# Patient Record
Sex: MALE | Race: WHITE | NOT HISPANIC OR LATINO | Employment: FULL TIME | URBAN - METROPOLITAN AREA
[De-identification: names, ages, dates, MRNs, and addresses within clinical notes are randomized per-mention and may not be internally consistent; named-entity substitution may affect disease eponyms.]

---

## 2017-12-28 ENCOUNTER — OFFICE VISIT (OUTPATIENT)
Dept: FAMILY MEDICINE CLINIC | Facility: CLINIC | Age: 57
End: 2017-12-28

## 2017-12-28 ENCOUNTER — TELEPHONE (OUTPATIENT)
Dept: FAMILY MEDICINE CLINIC | Facility: CLINIC | Age: 57
End: 2017-12-28

## 2017-12-28 VITALS
BODY MASS INDEX: 34.53 KG/M2 | OXYGEN SATURATION: 96 % | HEIGHT: 67 IN | WEIGHT: 220 LBS | RESPIRATION RATE: 18 BRPM | TEMPERATURE: 98.5 F | HEART RATE: 80 BPM | DIASTOLIC BLOOD PRESSURE: 78 MMHG | SYSTOLIC BLOOD PRESSURE: 120 MMHG

## 2017-12-28 DIAGNOSIS — Z76.89 ENCOUNTER TO ESTABLISH CARE WITH NEW DOCTOR: ICD-10-CM

## 2017-12-28 DIAGNOSIS — R05.9 COUGH: Primary | ICD-10-CM

## 2017-12-28 DIAGNOSIS — R93.89 ABNORMAL CXR: ICD-10-CM

## 2017-12-28 PROCEDURE — 99203 OFFICE O/P NEW LOW 30 MIN: CPT | Performed by: NURSE PRACTITIONER

## 2017-12-28 RX ORDER — TADALAFIL 20 MG
TABLET ORAL
COMMUNITY
Start: 2017-11-10

## 2017-12-28 RX ORDER — ALBUTEROL SULFATE 90 UG/1
2 AEROSOL, METERED RESPIRATORY (INHALATION) EVERY 4 HOURS PRN
Qty: 1 INHALER | Refills: 1 | Status: SHIPPED | OUTPATIENT
Start: 2017-12-28 | End: 2020-10-30

## 2017-12-28 RX ORDER — AZITHROMYCIN 250 MG/1
TABLET, FILM COATED ORAL
Qty: 6 TABLET | Refills: 0 | Status: SHIPPED | OUTPATIENT
Start: 2017-12-28 | End: 2020-10-30

## 2017-12-28 RX ORDER — BENZONATATE 100 MG/1
100 CAPSULE ORAL 3 TIMES DAILY PRN
Qty: 21 CAPSULE | Refills: 0 | Status: SHIPPED | OUTPATIENT
Start: 2017-12-28 | End: 2020-10-30

## 2017-12-28 RX ORDER — CLOBETASOL PROPIONATE 0.5 MG/G
CREAM TOPICAL
COMMUNITY
Start: 2017-12-20 | End: 2020-10-30

## 2017-12-28 NOTE — PROGRESS NOTES
Subjective   Saurabh Rogel is a 57 y.o. male.     History of Present Illness   Here to establish care, he saw a doctor at [place] years ago, but not recent. He had a work physical, works at [employer], did cxr, was told it was abnormal, thought he had pneumonia, he waas not given any medication, he had blood work for physical, did not bring copy of labs, he does have a cough in am, he states he had a cold about 3 weeks ago, he states he did not pass his pulmonary test at the physical, he has never smoked, he denies any asthma history, denies seasonal allergies. He has a productive cough, clear and green in color, he denies fever, he has noticed some wheezing with deep breath, denies fatigue, denies diarrhea.  He states the cough is better but not resolved   He states he is otherwise doing well, he does not exercise, his wife is in the room with him today. He states his diet could use improving. He sees derm for eczema, sees Dr. Patt English, he takes cialis as needed, sees Dr. Leone at [facility] urology. He denies any BP issues, denies CP, SOA. He had colonoscopy at age 48.   He has a hx of  Prostate biopsy, he has had hx of elevated PSA, he states biopsy was normal, he sees urology yearly.     The following portions of the patient's history were reviewed and updated as appropriate: allergies, current medications, past family history, past medical history, past social history, past surgical history and problem list.    Review of Systems   Constitutional: Negative for chills, diaphoresis, fatigue and fever.   HENT: Positive for congestion. Negative for ear pain, postnasal drip, rhinorrhea, sinus pressure and sore throat.    Respiratory: Positive for cough and wheezing. Negative for chest tightness and shortness of breath.    Cardiovascular: Negative for chest pain, palpitations and leg swelling.   Gastrointestinal: Negative for diarrhea.   Musculoskeletal: Negative for arthralgias and myalgias.   Skin:  Negative for pallor.   Allergic/Immunologic: Negative for environmental allergies.   Neurological: Negative for light-headedness and headaches.   All other systems reviewed and are negative.      Objective   Physical Exam   Constitutional: He is oriented to person, place, and time. He appears well-developed and well-nourished.   HENT:   Head: Normocephalic.   Right Ear: Tympanic membrane and ear canal normal.   Left Ear: Tympanic membrane and ear canal normal.   Nose: Nose normal.   Mouth/Throat: Uvula is midline, oropharynx is clear and moist and mucous membranes are normal.   Eyes: Pupils are equal, round, and reactive to light.   Neck: Neck supple.   Cardiovascular: Normal rate, regular rhythm and normal heart sounds.    Pulmonary/Chest: Effort normal. He has no decreased breath sounds. He has wheezes in the right lower field. He has rhonchi in the right lower field and the left lower field. He has no rales.   Musculoskeletal: Normal range of motion.   Neurological: He is alert and oriented to person, place, and time.   Skin: Skin is warm and dry.   Psychiatric: He has a normal mood and affect. His behavior is normal. Judgment and thought content normal.   Nursing note and vitals reviewed.      Assessment/Plan   Saurabh was seen today for cough.    Diagnoses and all orders for this visit:    Cough    Abnormal CXR    Encounter to establish care with new doctor    Other orders  -     azithromycin (ZITHROMAX) 250 MG tablet; Take 2 tablets the first day, then 1 tablet daily for 4 days.  -     benzonatate (TESSALON PERLES) 100 MG capsule; Take 1 capsule by mouth 3 (Three) Times a Day As Needed for Cough.  -     albuterol (PROVENTIL HFA;VENTOLIN HFA) 108 (90 Base) MCG/ACT inhaler; Inhale 2 puffs Every 4 (Four) Hours As Needed for Wheezing.    Start zpack, take as directed   Tessalon perles 100mg up to 3x day as needed for cough.  Albuterol inhaler 2 puffs every 4 hours as needed for SOA or wheezing. Educated about  use.  Unable to load disc with cxr, patient will fax radiology report and labs to office for review. Unavailable at time of appt.  F/u in about 3-4 weeks.  Increase fluid intake, get plenty of rest.   Patient agrees with plan of care and understands instructions. Call if worsening symptoms or any problems or concerns.

## 2017-12-28 NOTE — TELEPHONE ENCOUNTER
[facility] work physical documents reviewed, cxr radiology report from 12/19/17 shows normal cxr. No pneumonia noted. PFTs abnormal on report. Will refer to pulmonology and he will be called with appt. CHOL also mildly elevated, encourage low CHOL diet, exercise 20-30 min 3 days per week low impact such as walking, recheck in about 2 months.

## 2017-12-28 NOTE — PATIENT INSTRUCTIONS
Start zpack, take as directed   Tessalon perles 100mg up to 3x day as needed for cough.  Albuterol inhaler 2 puffs every 4 hours as needed for SOA or wheezing. Educated about use.  Unable to load disc with cxr, patient will fax radiology report and labs to office for review. Unavailable at time of appt.  F/u in about 3-4 weeks.  Increase fluid intake, get plenty of rest.   Patient agrees with plan of care and understands instructions. Call if worsening symptoms or any problems or concerns.

## 2020-10-30 ENCOUNTER — OFFICE VISIT (OUTPATIENT)
Dept: FAMILY MEDICINE CLINIC | Facility: CLINIC | Age: 60
End: 2020-10-30

## 2020-10-30 VITALS
HEART RATE: 72 BPM | SYSTOLIC BLOOD PRESSURE: 122 MMHG | RESPIRATION RATE: 18 BRPM | BODY MASS INDEX: 35.94 KG/M2 | WEIGHT: 229 LBS | TEMPERATURE: 98 F | HEIGHT: 67 IN | OXYGEN SATURATION: 99 % | DIASTOLIC BLOOD PRESSURE: 78 MMHG

## 2020-10-30 DIAGNOSIS — Z12.11 ENCOUNTER FOR SCREENING COLONOSCOPY: ICD-10-CM

## 2020-10-30 DIAGNOSIS — Z23 NEED FOR VACCINATION: ICD-10-CM

## 2020-10-30 DIAGNOSIS — H93.13 TINNITUS OF BOTH EARS: ICD-10-CM

## 2020-10-30 DIAGNOSIS — Z00.00 ANNUAL PHYSICAL EXAM: Primary | ICD-10-CM

## 2020-10-30 LAB
ALBUMIN SERPL-MCNC: 4.4 G/DL (ref 3.5–5.2)
ALBUMIN/GLOB SERPL: 1.8 G/DL
ALP SERPL-CCNC: 59 U/L (ref 39–117)
ALT SERPL W P-5'-P-CCNC: 22 U/L (ref 1–41)
ANION GAP SERPL CALCULATED.3IONS-SCNC: 9.3 MMOL/L (ref 5–15)
AST SERPL-CCNC: 20 U/L (ref 1–40)
BILIRUB SERPL-MCNC: 0.7 MG/DL (ref 0–1.2)
BUN SERPL-MCNC: 17 MG/DL (ref 8–23)
BUN/CREAT SERPL: 19.5 (ref 7–25)
CALCIUM SPEC-SCNC: 9.3 MG/DL (ref 8.6–10.5)
CHLORIDE SERPL-SCNC: 104 MMOL/L (ref 98–107)
CHOLEST SERPL-MCNC: 211 MG/DL (ref 0–200)
CO2 SERPL-SCNC: 24.7 MMOL/L (ref 22–29)
CREAT SERPL-MCNC: 0.87 MG/DL (ref 0.76–1.27)
GFR SERPL CREATININE-BSD FRML MDRD: 89 ML/MIN/1.73
GLOBULIN UR ELPH-MCNC: 2.4 GM/DL
GLUCOSE SERPL-MCNC: 96 MG/DL (ref 65–99)
HDLC SERPL-MCNC: 84 MG/DL (ref 40–60)
LDLC SERPL CALC-MCNC: 116 MG/DL (ref 0–100)
LDLC/HDLC SERPL: 1.36 {RATIO}
POTASSIUM SERPL-SCNC: 4.5 MMOL/L (ref 3.5–5.2)
PROT SERPL-MCNC: 6.8 G/DL (ref 6–8.5)
SODIUM SERPL-SCNC: 138 MMOL/L (ref 136–145)
TRIGL SERPL-MCNC: 63 MG/DL (ref 0–150)
VLDLC SERPL-MCNC: 11 MG/DL (ref 5–40)

## 2020-10-30 PROCEDURE — 80061 LIPID PANEL: CPT | Performed by: NURSE PRACTITIONER

## 2020-10-30 PROCEDURE — 90686 IIV4 VACC NO PRSV 0.5 ML IM: CPT | Performed by: NURSE PRACTITIONER

## 2020-10-30 PROCEDURE — 80053 COMPREHEN METABOLIC PANEL: CPT | Performed by: NURSE PRACTITIONER

## 2020-10-30 PROCEDURE — 90471 IMMUNIZATION ADMIN: CPT | Performed by: NURSE PRACTITIONER

## 2020-10-30 PROCEDURE — 99396 PREV VISIT EST AGE 40-64: CPT | Performed by: NURSE PRACTITIONER

## 2020-10-30 PROCEDURE — 36415 COLL VENOUS BLD VENIPUNCTURE: CPT | Performed by: NURSE PRACTITIONER

## 2020-10-30 NOTE — PATIENT INSTRUCTIONS
Labs today will call with results.   Refer for screening colonoscopy will call with appt,   Flu vaccine today,   Encouraged low chol diet, regular exercise such as walking.   May try claritin OTC as needed for fluid on ears,ringing, if symptoms persist call office will refer to ENT if needed. Deferred today.   Deferred ekg or cxr today.   Cont f/u with urology as scheduled.   Patient agrees with plan of care and understands instructions. Call if worsening symptoms or any problems or concerns.

## 2020-10-30 NOTE — PROGRESS NOTES
Subjective   Saurabh Rogel is a 60 y.o. male.     History of Present Illness   Here today for annual exam, he is retired, he does stay active, work around his daughter's house, he states working on diet, denies smoking. UTD dentist, he is also UTD eye exam, he would like flu vaccine today. He is fasting today.   He sees Dr. Ray, first urology, UTD prostate exam, he states he is UTD on tdap about 4 years ago. States again later this year.   He c/o tinnitus, states he noticed about 2 years ago. He states prev job had constant loud noise, has always had decreased hearing in left ear, did have hearing test yearly at work. Denies allergies, ear itching, denies muffled hearing.  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 Denies cough, SOA, difficulty breathing, does not smoke or use inhaler, he was referred to pulm in 2017 but [facility]     The following portions of the patient's history were reviewed and updated as appropriate: allergies, current medications, past family history, past medical history, past social history, past surgical history and problem list.    Review of Systems   Constitutional: Negative for chills, diaphoresis and fever.   HENT: Positive for tinnitus.    Respiratory: Negative for cough and shortness of breath.    Cardiovascular: Negative for chest pain.   Gastrointestinal: Negative for abdominal pain.   Genitourinary: Negative for frequency and urgency.   Musculoskeletal: Negative for arthralgias and myalgias.   Neurological: Negative for dizziness, light-headedness and headache.   All other systems reviewed and are negative.      Objective   Physical Exam  Vitals signs and nursing note reviewed.   Constitutional:       Appearance: He is well-developed.   HENT:      Head: Normocephalic.      Right Ear: Tympanic membrane and ear canal normal.      Left Ear: Tympanic membrane and ear canal normal.   Eyes:      Pupils: Pupils are  equal, round, and reactive to light.   Cardiovascular:      Rate and Rhythm: Normal rate and regular rhythm.      Pulses:           Radial pulses are 2+ on the right side and 2+ on the left side.        Dorsalis pedis pulses are 2+ on the right side and 2+ on the left side.        Posterior tibial pulses are 2+ on the right side and 2+ on the left side.      Heart sounds: Normal heart sounds.   Pulmonary:      Effort: Pulmonary effort is normal.      Breath sounds: Normal breath sounds. No decreased breath sounds or wheezing.   Abdominal:      General: Abdomen is flat.      Palpations: Abdomen is soft.      Tenderness: There is no abdominal tenderness.   Skin:     General: Skin is warm and dry.   Neurological:      Mental Status: He is alert and oriented to person, place, and time.      Cranial Nerves: Cranial nerves are intact.      Sensory: Sensation is intact.      Motor: Motor function is intact.      Coordination: Coordination is intact.   Psychiatric:         Behavior: Behavior normal.         Judgment: Judgment normal.           Assessment/Plan   Diagnoses and all orders for this visit:    1. Annual physical exam (Primary)  -     Comprehensive Metabolic Panel  -     Lipid Panel  -     Fluarix Quad >6 Months (2223-0628)    2. Encounter for screening colonoscopy  -     Ambulatory Referral For Screening Colonoscopy    3. Tinnitus of both ears  -     Comprehensive Metabolic Panel  -     Lipid Panel    4. Need for vaccination  -     Fluarix Quad >6 Months (9317-4055)        Labs today will call with results.   Refer for screening colonoscopy will call with appt,   Flu vaccine today,   Encouraged low chol diet, regular exercise such as walking.   May try claritin OTC as needed for fluid on ears,ringing, if symptoms persist call office will refer to ENT if needed. Deferred today.   Deferred ekg or cxr today.   If cough, SOA wheezing call office can consider repeat pft or pulm consult, can also consider inhaler if  needed.   Cont f/u with urology as scheduled.   Patient agrees with plan of care and understands instructions. Call if worsening symptoms or any problems or concerns.

## 2021-11-08 ENCOUNTER — TELEPHONE (OUTPATIENT)
Dept: FAMILY MEDICINE CLINIC | Facility: CLINIC | Age: 61
End: 2021-11-08

## 2021-11-08 NOTE — TELEPHONE ENCOUNTER
Caller: Kaya Rogel    Relationship to patient: Emergency Contact    Best call back number: [phone]    Patient is needing: PATIENT IS SCHEDULED TO GET SHINGLES VACCINE, FLU VACCINE, AND COVID-19 BOOSTER VACCINE TOMORROW 11/09/2021. PATIENT'S WIFE WANTING TO KNOW IF IT IS SAFE OR ADVISED TO GET ALL 3 VACCINES AT ONCE. CALLBACK REQUESTED

## 2022-08-01 ENCOUNTER — PATIENT ROUNDING (BHMG ONLY) (OUTPATIENT)
Dept: FAMILY MEDICINE CLINIC | Facility: CLINIC | Age: 62
End: 2022-08-01

## 2022-08-01 ENCOUNTER — OFFICE VISIT (OUTPATIENT)
Dept: FAMILY MEDICINE CLINIC | Facility: CLINIC | Age: 62
End: 2022-08-01

## 2022-08-01 VITALS
WEIGHT: 238 LBS | HEART RATE: 71 BPM | HEIGHT: 67 IN | DIASTOLIC BLOOD PRESSURE: 78 MMHG | OXYGEN SATURATION: 98 % | SYSTOLIC BLOOD PRESSURE: 126 MMHG | TEMPERATURE: 97.3 F | RESPIRATION RATE: 18 BRPM | BODY MASS INDEX: 37.35 KG/M2

## 2022-08-01 DIAGNOSIS — M17.12 ARTHRITIS OF LEFT KNEE: ICD-10-CM

## 2022-08-01 DIAGNOSIS — Z11.59 ENCOUNTER FOR HEPATITIS C SCREENING TEST FOR LOW RISK PATIENT: ICD-10-CM

## 2022-08-01 DIAGNOSIS — Z12.11 SCREEN FOR COLON CANCER: ICD-10-CM

## 2022-08-01 DIAGNOSIS — Z00.00 ANNUAL PHYSICAL EXAM: Primary | ICD-10-CM

## 2022-08-01 LAB
ALBUMIN SERPL-MCNC: 4.7 G/DL (ref 3.5–5.2)
ALBUMIN/GLOB SERPL: 2 G/DL
ALP SERPL-CCNC: 60 U/L (ref 39–117)
ALT SERPL W P-5'-P-CCNC: 24 U/L (ref 1–41)
ANION GAP SERPL CALCULATED.3IONS-SCNC: 11.2 MMOL/L (ref 5–15)
AST SERPL-CCNC: 21 U/L (ref 1–40)
BILIRUB SERPL-MCNC: 0.7 MG/DL (ref 0–1.2)
BUN SERPL-MCNC: 14 MG/DL (ref 8–23)
BUN/CREAT SERPL: 15.2 (ref 7–25)
CALCIUM SPEC-SCNC: 9.5 MG/DL (ref 8.6–10.5)
CHLORIDE SERPL-SCNC: 105 MMOL/L (ref 98–107)
CHOLEST SERPL-MCNC: 219 MG/DL (ref 0–200)
CO2 SERPL-SCNC: 25.8 MMOL/L (ref 22–29)
CREAT SERPL-MCNC: 0.92 MG/DL (ref 0.76–1.27)
EGFRCR SERPLBLD CKD-EPI 2021: 92.9 ML/MIN/1.73
ERYTHROCYTE [DISTWIDTH] IN BLOOD BY AUTOMATED COUNT: 12.8 % (ref 12.3–15.4)
GLOBULIN UR ELPH-MCNC: 2.4 GM/DL
GLUCOSE SERPL-MCNC: 98 MG/DL (ref 65–99)
HCT VFR BLD AUTO: 45.6 % (ref 37.5–51)
HCV AB SER DONR QL: NORMAL
HDLC SERPL-MCNC: 75 MG/DL (ref 40–60)
HGB BLD-MCNC: 14.8 G/DL (ref 13–17.7)
LDLC SERPL CALC-MCNC: 117 MG/DL (ref 0–100)
LDLC/HDLC SERPL: 1.51 {RATIO}
LYMPHOCYTES # BLD AUTO: 2.1 10*3/MM3 (ref 0.7–3.1)
LYMPHOCYTES NFR BLD AUTO: 30.4 % (ref 19.6–45.3)
MCH RBC QN AUTO: 29.5 PG (ref 26.6–33)
MCHC RBC AUTO-ENTMCNC: 32.4 G/DL (ref 31.5–35.7)
MCV RBC AUTO: 91 FL (ref 79–97)
MONOCYTES # BLD AUTO: 0.2 10*3/MM3 (ref 0.1–0.9)
MONOCYTES NFR BLD AUTO: 2.8 % (ref 5–12)
NEUTROPHILS NFR BLD AUTO: 4.5 10*3/MM3 (ref 1.7–7)
NEUTROPHILS NFR BLD AUTO: 66.8 % (ref 42.7–76)
PLATELET # BLD AUTO: 273 10*3/MM3 (ref 140–450)
PMV BLD AUTO: 8.3 FL (ref 6–12)
POTASSIUM SERPL-SCNC: 4.7 MMOL/L (ref 3.5–5.2)
PROT SERPL-MCNC: 7.1 G/DL (ref 6–8.5)
RBC # BLD AUTO: 5.01 10*6/MM3 (ref 4.14–5.8)
SODIUM SERPL-SCNC: 142 MMOL/L (ref 136–145)
TRIGL SERPL-MCNC: 154 MG/DL (ref 0–150)
TSH SERPL DL<=0.05 MIU/L-ACNC: 1.52 UIU/ML (ref 0.27–4.2)
VLDLC SERPL-MCNC: 27 MG/DL (ref 5–40)
WBC NRBC COR # BLD: 6.8 10*3/MM3 (ref 3.4–10.8)

## 2022-08-01 PROCEDURE — 84443 ASSAY THYROID STIM HORMONE: CPT | Performed by: NURSE PRACTITIONER

## 2022-08-01 PROCEDURE — 80053 COMPREHEN METABOLIC PANEL: CPT | Performed by: NURSE PRACTITIONER

## 2022-08-01 PROCEDURE — 86803 HEPATITIS C AB TEST: CPT | Performed by: NURSE PRACTITIONER

## 2022-08-01 PROCEDURE — 85025 COMPLETE CBC W/AUTO DIFF WBC: CPT | Performed by: NURSE PRACTITIONER

## 2022-08-01 PROCEDURE — 80061 LIPID PANEL: CPT | Performed by: NURSE PRACTITIONER

## 2022-08-01 PROCEDURE — 99396 PREV VISIT EST AGE 40-64: CPT | Performed by: NURSE PRACTITIONER

## 2022-08-01 RX ORDER — MELOXICAM 15 MG/1
TABLET ORAL
COMMUNITY
Start: 2022-07-20

## 2022-08-01 NOTE — PROGRESS NOTES
August 1, 2022    Hello, may I speak with Saurabh Rogel?    My name is Dennise      I am  with [facility]  [address] [place] [id number] [phone].    Before we get started may I verify your date of birth? 1960    I am calling to officially welcome you to our practice and ask about your recent visit. Is this a good time to talk? No    Tell me about your visit with us. What things went well? All went well       We're always looking for ways to make our patients' experiences even better. Do you have recommendations on ways we may improve?  No    Overall were you satisfied with your first visit to our practice? Yes       I appreciate you taking the time to speak with me today. Is there anything else I can do for you? No      Thank you, and have a great day.

## 2022-08-01 NOTE — PROGRESS NOTES
"Chief Complaint  Annual Exam (Wellness check up hasnt had labs/bp in a couple years)    Subjective        Saurabh Rogel presents to [facility]  Patient presents to the office for an annual visit. Patient blood pressure is BP: 126/78 (08/01/22 0822).  Patient has arthritis in his left knee.  Patient takes meloxicam as needed.  Patient is stable for with joint pain.  Patient is due for colonoscopy.  Patient follows urology to have PSA level checked.  Patient denies depression and/or anxiety.  Patient denies chest pain / shortness of air. Patient is without acute pain or distress at time of dictation.         Objective   Vital Signs:  /78 (BP Location: Left arm, Patient Position: Sitting)   Pulse 71   Temp 97.3 °F (36.3 °C) (Infrared)   Resp 18   Ht 170.2 cm (67\")   Wt 108 kg (238 lb)   SpO2 98%   BMI 37.28 kg/m²   Estimated body mass index is 37.28 kg/m² as calculated from the following:    Height as of this encounter: 170.2 cm (67\").    Weight as of this encounter: 108 kg (238 lb).    Class 2 Severe Obesity (BMI >=35 and <=39.9). Obesity-related health conditions include the following: none. Obesity is unchanged. BMI is is above average; BMI management plan is completed. We discussed portion control and increasing exercise.      Physical Exam  Constitutional:       General: He is not in acute distress.     Appearance: Normal appearance. He is not ill-appearing, toxic-appearing or diaphoretic.   HENT:      Head: Normocephalic.      Right Ear: Tympanic membrane and external ear normal. There is no impacted cerumen.      Left Ear: Tympanic membrane and external ear normal. There is no impacted cerumen.      Nose: Nose normal. No congestion or rhinorrhea.      Mouth/Throat:      Mouth: Mucous membranes are moist.      Pharynx: Oropharynx is clear. No oropharyngeal exudate or posterior oropharyngeal erythema.   Eyes:      General:         Right eye: No discharge.         Left " eye: No discharge.      Extraocular Movements: Extraocular movements intact.      Conjunctiva/sclera: Conjunctivae normal.      Pupils: Pupils are equal, round, and reactive to light.   Neck:      Vascular: No carotid bruit.   Cardiovascular:      Rate and Rhythm: Normal rate and regular rhythm.      Pulses: Normal pulses.      Heart sounds: Normal heart sounds. No murmur heard.    No friction rub. No gallop.   Pulmonary:      Effort: Pulmonary effort is normal. No respiratory distress.      Breath sounds: Normal breath sounds. No wheezing, rhonchi or rales.   Chest:      Chest wall: No tenderness.   Abdominal:      General: Abdomen is flat. Bowel sounds are normal. There is no distension.      Palpations: Abdomen is soft. There is no mass.      Tenderness: There is no abdominal tenderness. There is no guarding or rebound.      Hernia: No hernia is present.   Musculoskeletal:         General: No swelling. Normal range of motion.      Cervical back: Normal range of motion and neck supple. No tenderness.      Right knee: Normal.      Left knee: Tenderness present.   Skin:     General: Skin is warm and dry.      Coloration: Skin is not jaundiced or pale.      Findings: No erythema or rash.   Neurological:      Mental Status: He is alert and oriented to person, place, and time.      Sensory: No sensory deficit.      Motor: No weakness.      Gait: Gait normal.   Psychiatric:         Mood and Affect: Mood normal.         Behavior: Behavior normal.         Thought Content: Thought content normal.         Judgment: Judgment normal.        Result Review :  The following data was reviewed by: AMRIT Chavarria on 08/01/2022:  Common labs    Common Labsle 8/1/22   WBC 6.80   Hemoglobin 14.8   Hematocrit 45.6   Platelets 273                    Assessment and Plan   Diagnoses and all orders for this visit:    1. Annual physical exam (Primary)  -     CBC & Differential  -     Comprehensive Metabolic Panel  -     Lipid  Panel  -     TSH    2. Arthritis of left knee    3. Encounter for hepatitis C screening test for low risk patient  -     Hepatitis C antibody    4. Screen for colon cancer  -     Ambulatory Referral For Screening Colonoscopy      Counseling was provided on nutrition, physical activity, development, and injury prevention, dental health, and safe sex practices patient verbalizes understanding no additional questions were asked.    Arthritis stable with meloxicam.  Patient takes as needed.  Check labs today for medication monitoring.  CMP.       I spent 30 minutes caring for Saurabh on this date of service. This time includes time spent by me in the following activities:preparing for the visit, reviewing tests, obtaining and/or reviewing a separately obtained history, performing a medically appropriate examination and/or evaluation , counseling and educating the patient/family/caregiver, ordering medications, tests, or procedures and documenting information in the medical record  Follow Up   Return in about 1 year (around 8/1/2023) for Annual physical.  Patient was given instructions and counseling regarding his condition or for health maintenance advice. Please see specific information pulled into the AVS if appropriate.

## 2022-10-19 ENCOUNTER — TELEPHONE (OUTPATIENT)
Dept: FAMILY MEDICINE CLINIC | Facility: CLINIC | Age: 62
End: 2022-10-19

## 2022-10-19 NOTE — TELEPHONE ENCOUNTER
Caller: Kaya Rogel    Relationship: Emergency Contact    Best call back number: [phone]    What specialty or service is being requested: COLONOSCOPY    What is the provider, practice or medical service name: DR PARKER    Any additional details: PATIENTS WIFE STATED THEY WERE DIRECTED TO [facility] WITH THE FAX NUMBER FOR DR SANTO OFFICE.    PATIENTS WIFE STATED THAT THE FAX NUMBER [phone]

## 2023-10-06 ENCOUNTER — TELEPHONE (OUTPATIENT)
Dept: FAMILY MEDICINE CLINIC | Facility: CLINIC | Age: 63
End: 2023-10-06

## 2023-10-06 NOTE — TELEPHONE ENCOUNTER
Caller: Kaya Rogel    Relationship: Emergency Contact    Best call back number: [phone]     What was the call regarding: PATIENT IS SCHEDULED FOR 10/9/23 FOR HIS PHYSICAL WITH [facility] DUE TO [employer] NOT HAVING ANY OPENINGS UNTIL DECEMBER     PLEASE ADVISE

## 2023-10-09 ENCOUNTER — OFFICE VISIT (OUTPATIENT)
Dept: FAMILY MEDICINE CLINIC | Facility: CLINIC | Age: 63
End: 2023-10-09
Payer: MEDICARE

## 2023-10-09 VITALS
HEART RATE: 76 BPM | TEMPERATURE: 97 F | SYSTOLIC BLOOD PRESSURE: 144 MMHG | HEIGHT: 67 IN | DIASTOLIC BLOOD PRESSURE: 98 MMHG | OXYGEN SATURATION: 97 % | BODY MASS INDEX: 37.35 KG/M2 | WEIGHT: 238 LBS

## 2023-10-09 DIAGNOSIS — E78.2 MIXED HYPERLIPIDEMIA: ICD-10-CM

## 2023-10-09 DIAGNOSIS — Z23 ENCOUNTER FOR IMMUNIZATION: ICD-10-CM

## 2023-10-09 DIAGNOSIS — Z12.5 SCREENING FOR PROSTATE CANCER: ICD-10-CM

## 2023-10-09 DIAGNOSIS — G89.29 CHRONIC RIGHT SHOULDER PAIN: ICD-10-CM

## 2023-10-09 DIAGNOSIS — M17.12 ARTHRITIS OF LEFT KNEE: ICD-10-CM

## 2023-10-09 DIAGNOSIS — Z00.00 ENCOUNTER FOR ANNUAL PHYSICAL EXAM: Primary | ICD-10-CM

## 2023-10-09 DIAGNOSIS — M25.511 CHRONIC RIGHT SHOULDER PAIN: ICD-10-CM

## 2023-10-09 DIAGNOSIS — E66.9 CLASS 2 OBESITY WITHOUT SERIOUS COMORBIDITY WITH BODY MASS INDEX (BMI) OF 37.0 TO 37.9 IN ADULT, UNSPECIFIED OBESITY TYPE: ICD-10-CM

## 2023-10-09 DIAGNOSIS — Z13.29 SCREENING FOR THYROID DISORDER: ICD-10-CM

## 2023-10-09 LAB
ALBUMIN SERPL-MCNC: 4.8 G/DL (ref 3.5–5.2)
ALBUMIN/GLOB SERPL: 2.3 G/DL
ALP SERPL-CCNC: 65 U/L (ref 39–117)
ALT SERPL-CCNC: 24 U/L (ref 1–41)
AST SERPL-CCNC: 20 U/L (ref 1–40)
BASOPHILS # BLD AUTO: 0.03 10*3/MM3 (ref 0–0.2)
BASOPHILS NFR BLD AUTO: 0.4 % (ref 0–1.5)
BILIRUB SERPL-MCNC: 0.8 MG/DL (ref 0–1.2)
BUN SERPL-MCNC: 15 MG/DL (ref 8–23)
BUN/CREAT SERPL: 16.1 (ref 7–25)
CALCIUM SERPL-MCNC: 9.9 MG/DL (ref 8.6–10.5)
CHLORIDE SERPL-SCNC: 103 MMOL/L (ref 98–107)
CHOLEST SERPL-MCNC: 214 MG/DL (ref 0–200)
CO2 SERPL-SCNC: 27.7 MMOL/L (ref 22–29)
CREAT SERPL-MCNC: 0.93 MG/DL (ref 0.76–1.27)
EGFRCR SERPLBLD CKD-EPI 2021: 91.1 ML/MIN/1.73
EOSINOPHIL # BLD AUTO: 0.16 10*3/MM3 (ref 0–0.4)
EOSINOPHIL NFR BLD AUTO: 2.1 % (ref 0.3–6.2)
ERYTHROCYTE [DISTWIDTH] IN BLOOD BY AUTOMATED COUNT: 12.1 % (ref 12.3–15.4)
GLOBULIN SER CALC-MCNC: 2.1 GM/DL
GLUCOSE SERPL-MCNC: 119 MG/DL (ref 65–99)
HCT VFR BLD AUTO: 44.7 % (ref 37.5–51)
HDLC SERPL-MCNC: 80 MG/DL (ref 40–60)
HGB BLD-MCNC: 15.1 G/DL (ref 13–17.7)
IMM GRANULOCYTES # BLD AUTO: 0.01 10*3/MM3 (ref 0–0.05)
IMM GRANULOCYTES NFR BLD AUTO: 0.1 % (ref 0–0.5)
LDLC SERPL CALC-MCNC: 107 MG/DL (ref 0–100)
LYMPHOCYTES # BLD AUTO: 1.74 10*3/MM3 (ref 0.7–3.1)
LYMPHOCYTES NFR BLD AUTO: 22.8 % (ref 19.6–45.3)
MCH RBC QN AUTO: 30.8 PG (ref 26.6–33)
MCHC RBC AUTO-ENTMCNC: 33.8 G/DL (ref 31.5–35.7)
MCV RBC AUTO: 91 FL (ref 79–97)
MONOCYTES # BLD AUTO: 0.7 10*3/MM3 (ref 0.1–0.9)
MONOCYTES NFR BLD AUTO: 9.2 % (ref 5–12)
NEUTROPHILS # BLD AUTO: 4.98 10*3/MM3 (ref 1.7–7)
NEUTROPHILS NFR BLD AUTO: 65.4 % (ref 42.7–76)
NRBC BLD AUTO-RTO: 0 /100 WBC (ref 0–0.2)
PLATELET # BLD AUTO: 289 10*3/MM3 (ref 140–450)
POTASSIUM SERPL-SCNC: 4.6 MMOL/L (ref 3.5–5.2)
PROT SERPL-MCNC: 6.9 G/DL (ref 6–8.5)
PSA SERPL-MCNC: 2.68 NG/ML (ref 0–4)
RBC # BLD AUTO: 4.91 10*6/MM3 (ref 4.14–5.8)
SODIUM SERPL-SCNC: 140 MMOL/L (ref 136–145)
TRIGL SERPL-MCNC: 158 MG/DL (ref 0–150)
TSH SERPL DL<=0.005 MIU/L-ACNC: 1.22 UIU/ML (ref 0.27–4.2)
VLDLC SERPL CALC-MCNC: 27 MG/DL (ref 5–40)
WBC # BLD AUTO: 7.62 10*3/MM3 (ref 3.4–10.8)

## 2023-10-09 NOTE — PATIENT INSTRUCTIONS
MyPlate from USDA    MyPlate is an outline of a general healthy diet based on the Dietary Guidelines for Americans, 4236-6912, from the U.S. Department of Agriculture (USDA). It sets guidelines for how much food you should eat from each food group based on your age, sex, and level of physical activity.  What are tips for following MyPlate?  To follow MyPlate recommendations:  Eat a wide variety of fruits and vegetables, grains, and protein foods.  Serve smaller portions and eat less food throughout the day.  Limit portion sizes to avoid overeating.  Enjoy your food.  Get at least 150 minutes of exercise every week. This is about 30 minutes each day, 5 or more days per week.  It can be difficult to have every meal look like MyPlate. Think about MyPlate as eating guidelines for an entire day, rather than each individual meal.  Fruits and vegetables  Make one half of your plate fruits and vegetables.  Eat many different colors of fruits and vegetables each day.  For a 2,000-calorie daily food plan, eat:  2« cups of vegetables every day.  2 cups of fruit every day.  1 cup is equal to:  1 cup raw or cooked vegetables.  1 cup raw fruit.  1 medium-sized orange, apple, or banana.  1 cup 100% fruit or vegetable juice.  2 cups raw leafy greens, such as lettuce, spinach, or kale.  « cup dried fruit.  Grains  One fourth of your plate should be grains.  Make at least half of the grains you eat each day whole grains.  For a 2,000-calorie daily food plan, eat 6 oz of grains every day.  1 oz is equal to:  1 slice bread.  1 cup cereal.  « cup cooked rice, cereal, or pasta.  Protein  One fourth of your plate should be protein.  Eat a wide variety of protein foods, including meat, poultry, fish, eggs, beans, nuts, and tofu.  For a 2,000-calorie daily food plan, eat 5« oz of protein every day.  1 oz is equal to:  1 oz meat, poultry, or fish.  ¬ cup cooked beans.  1 egg.  « oz nuts or seeds.  1 Tbsp peanut butter.  Dairy  Drink fat-free  or low-fat (1%) milk.  Eat or drink dairy as a side to meals.  For a 2,000-calorie daily food plan, eat or drink 3 cups of dairy every day.  1 cup is equal to:  1 cup milk, yogurt, cottage cheese, or soy milk (soy beverage).  2 oz processed cheese.  1« oz natural cheese.  Fats, oils, salt, and sugars  Only small amounts of oils are recommended.  Avoid foods that are high in calories and low in nutritional value (empty calories), like foods high in fat or added sugars.  Choose foods that are low in salt (sodium). Choose foods that have less than 140 milligrams (mg) of sodium per serving.  Drink water instead of sugary drinks. Drink enough fluid to keep your urine pale yellow.  Where to find support  Work with your health care provider or a dietitian to develop a customized eating plan that is right for you.  Download an jessica (mobile application) to help you track your daily food intake.  Where to find more information  USDA: ChooseMyPlate.gov  Summary  MyPlate is a general guideline for healthy eating from the USDA. It is based on the Dietary Guidelines for Americans, 9492-6657.  In general, fruits and vegetables should take up one half of your plate, grains should take up one fourth of your plate, and protein should take up one fourth of your plate.  This information is not intended to replace advice given to you by your health care provider. Make sure you discuss any questions you have with your health care provider.  Document Revised: 11/08/2021 Document Reviewed: 11/08/2021  [employer] Patient Education c 2023 [employer]

## 2023-10-09 NOTE — PROGRESS NOTES
Chief Complaint  Annual Exam (Pt is here today for annual exam.)    Subjective            Saurabh Rogel presents to [facility]    History of Present Illness  63 year old male presents for annual physical. Pt has mixed hyperlipidemia and BMI of 37.28. CBC, CMP, and Lipid panel ordered. TSH ordered to screen for thyroid disorder. Pt sees Dr. Lucero annual with [facility]. PSA ordered to screen for prostate cancer. Pt also sees Dr. Flores for arthritis of knee at [facility]. Pt currently takes meloxicam for pain management. Pt states he has right shoulder pain that makes him unable to full extend arm above head. States it began about 8 months ago. Encouraged massages and stretching. Physical therapy referral denied at this time. Encouraged continuing meloxicam for pain. Xray of shoulder ordered. Influenza vaccine administered in clinic today. Informed patient that he can receive shingles vaccine through pharmacy. Denies chest pain and shortness of breath. Denies changes in bowel or bladder habits.     CPE- Patient reporting that health is doing well overall.  Patient is here today for annual physical.  Eating a balanced diet.    Labs: Due for routine labs    Colorectal cancer screening: last performed 11/14/22.  Breast cancer screening: n/a  Cervical cancer screening: n/a  Immunization History   Administered Date(s) Administered    COVID-19 (PFIZER) Purple Cap Monovalent 03/08/2021, 03/30/2021, 11/09/2021    Flublok 18+yrs 11/09/2021    Fluzone (or Fluarix & Flulaval for VFC) >6mos 10/30/2020    Fluzone High-Dose 65+yrs 10/09/2023      Patient has seen dentist within last 6 months  yes  Patient has seen eye specialist within last 6 months yes. Sees Dr. Redding at [facility] for [facility].     PHQ-2 Depression Screening  Little interest or pleasure in doing things? 0-->not at all   Feeling down, depressed, or hopeless? 0-->not at all   PHQ-2 Total Score 0      Social History  "    Socioeconomic History    Marital status:    Tobacco Use    Smoking status: Never    Smokeless tobacco: Never   Vaping Use    Vaping Use: Never used   Substance and Sexual Activity    Alcohol use: Yes     Comment: daily    Drug use: No    Sexual activity: Not Currently     Partners: Female     Comment: Ed use pill        Objective   Vital Signs:   /98   Pulse 76   Temp 97 øF (36.1 øC)   Ht 170.2 cm (67\")   Wt 108 kg (238 lb)   SpO2 97%   BMI 37.28 kg/mý       Physical Exam  Constitutional:       Appearance: Normal appearance.   HENT:      Head: Normocephalic.      Right Ear: Tympanic membrane, ear canal and external ear normal.      Left Ear: Tympanic membrane, ear canal and external ear normal.      Nose: Nose normal.      Mouth/Throat:      Mouth: Mucous membranes are moist.      Pharynx: Oropharynx is clear.   Eyes:      Conjunctiva/sclera: Conjunctivae normal.      Pupils: Pupils are equal, round, and reactive to light.   Cardiovascular:      Rate and Rhythm: Normal rate and regular rhythm.      Pulses: Normal pulses.   Pulmonary:      Effort: Pulmonary effort is normal.      Breath sounds: Normal breath sounds.   Abdominal:      General: Abdomen is flat.   Musculoskeletal:         General: Normal range of motion.      Right shoulder: Decreased range of motion.      Left shoulder: Normal.      Cervical back: Normal range of motion.   Skin:     General: Skin is warm.   Neurological:      General: No focal deficit present.      Mental Status: He is alert and oriented to person, place, and time.   Psychiatric:         Mood and Affect: Mood normal.         Behavior: Behavior normal.          Result Review :   The following data was reviewed by: AMRIT Meléndez on 10/09/2023:    Data reviewed : labs            Current Outpatient Medications:     meloxicam (MOBIC) 15 MG tablet, , Disp: , Rfl:     CIALIS 20 MG tablet, , Disp: , Rfl:           Assessment and Plan    Diagnoses and all orders " for this visit:    1. Encounter for annual physical exam (Primary)  -     CBC w AUTO Differential  -     Comprehensive metabolic panel    2. Encounter for immunization  -     Fluzone High-Dose 65+yrs (9352-6329)    3. Mixed hyperlipidemia  -     CBC w AUTO Differential  -     Comprehensive metabolic panel  -     Lipid panel    4. Screening for prostate cancer  -     PSA SCREENING    5. Screening for thyroid disorder  -     TSH    6. Class 2 obesity without serious comorbidity with body mass index (BMI) of 37.0 to 37.9 in adult, unspecified obesity type  -     CBC w AUTO Differential  -     Comprehensive metabolic panel    7. Chronic right shoulder pain  -     XR Shoulder 2+ View Right; Future    8. Arthritis of left knee          The patient was counseled regarding nutrition, physical activity, healthy weight, injury prevention, misuse of tobacco, alcohol and illicit drugs, mental health, immunizations, and screenings.      Follow Up   No follow-ups on file.  Patient was given instructions and counseling regarding his condition or for health maintenance advice. Please see specific information pulled into the AVS if appropriate.

## 2024-06-13 ENCOUNTER — LAB (OUTPATIENT)
Dept: LAB | Facility: HOSPITAL | Age: 64
End: 2024-06-13
Payer: MEDICARE

## 2024-06-13 ENCOUNTER — TRANSCRIBE ORDERS (OUTPATIENT)
Dept: CARDIOLOGY | Facility: HOSPITAL | Age: 64
End: 2024-06-13
Payer: COMMERCIAL

## 2024-06-13 ENCOUNTER — TRANSCRIBE ORDERS (OUTPATIENT)
Dept: LAB | Facility: HOSPITAL | Age: 64
End: 2024-06-13
Payer: COMMERCIAL

## 2024-06-13 ENCOUNTER — HOSPITAL ENCOUNTER (OUTPATIENT)
Dept: CARDIOLOGY | Facility: HOSPITAL | Age: 64
Discharge: HOME | End: 2024-06-13
Payer: MEDICARE

## 2024-06-13 ENCOUNTER — HOSPITAL ENCOUNTER (OUTPATIENT)
Dept: GENERAL RADIOLOGY | Facility: HOSPITAL | Age: 64
Discharge: HOME | End: 2024-06-13
Payer: MEDICARE

## 2024-06-13 DIAGNOSIS — R79.1 ABNORMAL COAGULATION PROFILE: ICD-10-CM

## 2024-06-13 DIAGNOSIS — M48.02 CERVICAL STENOSIS OF SPINE: ICD-10-CM

## 2024-06-13 DIAGNOSIS — Z01.811 PRE-OP CHEST EXAM: Primary | ICD-10-CM

## 2024-06-13 DIAGNOSIS — M48.02 CERVICAL STENOSIS OF SPINE: Primary | ICD-10-CM

## 2024-06-13 DIAGNOSIS — Z01.818 PRE-OP TESTING: ICD-10-CM

## 2024-06-13 LAB
ALBUMIN SERPL-MCNC: 4.2 G/DL (ref 3.5–5.2)
ALBUMIN/GLOB SERPL: 1.6 G/DL
ALP SERPL-CCNC: 63 U/L (ref 39–117)
ALT SERPL W P-5'-P-CCNC: 18 U/L (ref 1–41)
ANION GAP SERPL CALCULATED.3IONS-SCNC: 7 MMOL/L (ref 5–15)
APTT PPP: 33.7 SECONDS (ref 22.7–35.4)
AST SERPL-CCNC: 15 U/L (ref 1–40)
BASOPHILS # BLD AUTO: 0.04 10*3/MM3 (ref 0–0.2)
BASOPHILS NFR BLD AUTO: 0.5 % (ref 0–1.5)
BILIRUB SERPL-MCNC: 0.6 MG/DL (ref 0–1.2)
BILIRUB UR QL STRIP: NEGATIVE
BUN SERPL-MCNC: 13 MG/DL (ref 8–23)
BUN/CREAT SERPL: 14.6 (ref 7–25)
CALCIUM SPEC-SCNC: 9.1 MG/DL (ref 8.6–10.5)
CHLORIDE SERPL-SCNC: 106 MMOL/L (ref 98–107)
CLARITY UR: CLEAR
CO2 SERPL-SCNC: 27 MMOL/L (ref 22–29)
COLOR UR: YELLOW
CREAT SERPL-MCNC: 0.89 MG/DL (ref 0.76–1.27)
DEPRECATED RDW RBC AUTO: 38.9 FL (ref 37–54)
EGFRCR SERPLBLD CKD-EPI 2021: 95.1 ML/MIN/1.73
EOSINOPHIL # BLD AUTO: 0.16 10*3/MM3 (ref 0–0.4)
EOSINOPHIL NFR BLD AUTO: 2.2 % (ref 0.3–6.2)
ERYTHROCYTE [DISTWIDTH] IN BLOOD BY AUTOMATED COUNT: 11.9 % (ref 12.3–15.4)
GLOBULIN UR ELPH-MCNC: 2.6 GM/DL
GLUCOSE SERPL-MCNC: 98 MG/DL (ref 65–99)
GLUCOSE UR STRIP-MCNC: NEGATIVE MG/DL
HCT VFR BLD AUTO: 45.3 % (ref 37.5–51)
HGB BLD-MCNC: 15.4 G/DL (ref 13–17.7)
HGB UR QL STRIP.AUTO: NEGATIVE
IMM GRANULOCYTES # BLD AUTO: 0.02 10*3/MM3 (ref 0–0.05)
IMM GRANULOCYTES NFR BLD AUTO: 0.3 % (ref 0–0.5)
INR PPP: 1 (ref 0.9–1.1)
KETONES UR QL STRIP: NEGATIVE
LEUKOCYTE ESTERASE UR QL STRIP.AUTO: NEGATIVE
LYMPHOCYTES # BLD AUTO: 1.98 10*3/MM3 (ref 0.7–3.1)
LYMPHOCYTES NFR BLD AUTO: 26.8 % (ref 19.6–45.3)
MCH RBC QN AUTO: 30.3 PG (ref 26.6–33)
MCHC RBC AUTO-ENTMCNC: 34 G/DL (ref 31.5–35.7)
MCV RBC AUTO: 89 FL (ref 79–97)
MONOCYTES # BLD AUTO: 0.72 10*3/MM3 (ref 0.1–0.9)
MONOCYTES NFR BLD AUTO: 9.8 % (ref 5–12)
NEUTROPHILS NFR BLD AUTO: 4.46 10*3/MM3 (ref 1.7–7)
NEUTROPHILS NFR BLD AUTO: 60.4 % (ref 42.7–76)
NITRITE UR QL STRIP: NEGATIVE
NRBC BLD AUTO-RTO: 0 /100 WBC (ref 0–0.2)
PH UR STRIP.AUTO: 8.5 [PH] (ref 5–8)
PLATELET # BLD AUTO: 320 10*3/MM3 (ref 140–450)
PMV BLD AUTO: 10 FL (ref 6–12)
POTASSIUM SERPL-SCNC: 4.5 MMOL/L (ref 3.5–5.2)
PROT SERPL-MCNC: 6.8 G/DL (ref 6–8.5)
PROT UR QL STRIP: NEGATIVE
PROTHROMBIN TIME: 13.4 SECONDS (ref 11.7–14.2)
QT INTERVAL: 431 MS
QTC INTERVAL: 442 MS
RBC # BLD AUTO: 5.09 10*6/MM3 (ref 4.14–5.8)
SODIUM SERPL-SCNC: 140 MMOL/L (ref 136–145)
SP GR UR STRIP: 1.01 (ref 1–1.03)
UROBILINOGEN UR QL STRIP: ABNORMAL
WBC NRBC COR # BLD AUTO: 7.38 10*3/MM3 (ref 3.4–10.8)

## 2024-06-13 PROCEDURE — 85025 COMPLETE CBC W/AUTO DIFF WBC: CPT

## 2024-06-13 PROCEDURE — 85610 PROTHROMBIN TIME: CPT

## 2024-06-13 PROCEDURE — 81003 URINALYSIS AUTO W/O SCOPE: CPT

## 2024-06-13 PROCEDURE — 93010 ELECTROCARDIOGRAM REPORT: CPT | Performed by: INTERNAL MEDICINE

## 2024-06-13 PROCEDURE — 71046 X-RAY EXAM CHEST 2 VIEWS: CPT

## 2024-06-13 PROCEDURE — 85730 THROMBOPLASTIN TIME PARTIAL: CPT | Performed by: NEUROLOGICAL SURGERY

## 2024-06-13 PROCEDURE — 93005 ELECTROCARDIOGRAM TRACING: CPT

## 2024-06-13 PROCEDURE — 36415 COLL VENOUS BLD VENIPUNCTURE: CPT | Performed by: NEUROLOGICAL SURGERY

## 2024-06-13 PROCEDURE — 80053 COMPREHEN METABOLIC PANEL: CPT

## 2024-10-10 ENCOUNTER — OFFICE VISIT (OUTPATIENT)
Dept: FAMILY MEDICINE CLINIC | Facility: CLINIC | Age: 64
End: 2024-10-10
Payer: MEDICARE

## 2024-10-10 VITALS
DIASTOLIC BLOOD PRESSURE: 88 MMHG | BODY MASS INDEX: 36.66 KG/M2 | SYSTOLIC BLOOD PRESSURE: 136 MMHG | HEART RATE: 64 BPM | HEIGHT: 67 IN | WEIGHT: 233.6 LBS | OXYGEN SATURATION: 97 % | TEMPERATURE: 98.6 F

## 2024-10-10 DIAGNOSIS — Z23 NEED FOR IMMUNIZATION AGAINST INFLUENZA: ICD-10-CM

## 2024-10-10 DIAGNOSIS — Z12.5 SCREENING FOR PROSTATE CANCER: ICD-10-CM

## 2024-10-10 DIAGNOSIS — Z23 ENCOUNTER FOR IMMUNIZATION: ICD-10-CM

## 2024-10-10 DIAGNOSIS — E78.5 DYSLIPIDEMIA: ICD-10-CM

## 2024-10-10 DIAGNOSIS — Z00.00 MEDICARE ANNUAL WELLNESS VISIT, INITIAL: Primary | ICD-10-CM

## 2024-10-10 RX ORDER — TAMSULOSIN HYDROCHLORIDE 0.4 MG/1
1 CAPSULE ORAL DAILY
COMMUNITY

## 2024-10-10 NOTE — PROGRESS NOTES
Subjective   The ABCs of the Annual Wellness Visit  Medicare Wellness Visit      Saurabh Rogel is a 64 y.o. patient who presents for a Medicare Wellness Visit.    The following portions of the patient's history were reviewed and   updated as appropriate: allergies, current medications, past family history, past medical history, past social history, past surgical history, and problem list.    Compared to one year ago, the patient's physical   health is the same.  Compared to one year ago, the patient's mental   health is the same.    Recent Hospitalizations:  He was admitted within the past 365 days at [facility]. Was admitted overnight due to surgery performed.    Current Medical Providers:  Patient Care Team:  Ethel Nowak APRN as PCP - General (Nurse Practitioner)  Klever Pablo MD as Consulting Physician (Neurosurgery)  Justo Lucero MD as Consulting Physician (Urology)    Outpatient Medications Prior to Visit   Medication Sig Dispense Refill    meloxicam (MOBIC) 15 MG tablet       tamsulosin (FLOMAX) 0.4 MG capsule 24 hr capsule Take 1 capsule by mouth Daily.      CIALIS 20 MG tablet        No facility-administered medications prior to visit.     No opioid medication identified on active medication list. I have reviewed chart for other potential  high risk medication/s and harmful drug interactions in the elderly.      Aspirin is not on active medication list.  Aspirin use is not indicated based on review of current medical condition/s. Risk of harm outweighs potential benefits.  .    Patient Active Problem List   Diagnosis    Screen for colon cancer    Arthritis of left knee     Advance Care Planning Advance Directive is not on file.  ACP discussion was declined by the patient. Patient does not have an advance directive, declines further assistance.            Objective   Vitals:    10/10/24 0807   BP: 136/88   BP Location: Left arm   Patient Position: Sitting   Cuff  "Size: Large Adult   Pulse: 64   Temp: 98.6 °F (37 °C)   SpO2: 97%   Weight: 106 kg (233 lb 9.6 oz)   Height: 170.2 cm (67.01\")       Estimated body mass index is 36.58 kg/m² as calculated from the following:    Height as of this encounter: 170.2 cm (67.01\").    Weight as of this encounter: 106 kg (233 lb 9.6 oz).    Class 2 Severe Obesity (BMI >=35 and <=39.9). Obesity-related health conditions include the following: dyslipidemias. Obesity is unchanged. BMI is is above average; BMI management plan is completed. We discussed portion control, increasing exercise, and Information on healthy weight added to patient's after visit summary.       Does the patient have evidence of cognitive impairment? No                                                                                                Health  Risk Assessment    Smoking Status:  Social History     Tobacco Use   Smoking Status Never   Smokeless Tobacco Never     Alcohol Consumption:  Social History     Substance and Sexual Activity   Alcohol Use Yes    Alcohol/week: 2.0 standard drinks of alcohol    Types: 2 Cans of beer per week    Comment: Occasionally       Fall Risk Screen  STEADI Fall Risk Assessment was completed, and patient is at MODERATE risk for falls. Assessment completed on:10/10/2024    Depression Screening:      10/10/2024     8:10 AM   PHQ-2/PHQ-9 Depression Screening   Little Interest or Pleasure in Doing Things 0-->not at all   Feeling Down, Depressed or Hopeless 0-->not at all   PHQ-9: Brief Depression Severity Measure Score 0     Health Habits and Functional and Cognitive Screening:      10/4/2024    10:36 AM   Functional & Cognitive Status   Do you have difficulty preparing food and eating? No   Do you have difficulty bathing yourself, getting dressed or grooming yourself? No   Do you have difficulty using the toilet? No   Do you have difficulty moving around from place to place? No   Do you have trouble with steps or getting out of a bed or " a chair? No   Current Diet Well Balanced Diet   Dental Exam Up to date   Eye Exam Not up to date   Exercise (times per week) 2 times per week   Current Exercises Include Walking   Do you need help using the phone?  No   Are you deaf or do you have serious difficulty hearing?  No   Do you need help to go to places out of walking distance? No   Do you need help shopping? No   Do you need help preparing meals?  No   Do you need help with housework?  No   Do you need help with laundry? No   Do you need help taking your medications? No   Do you need help managing money? No   Do you ever drive or ride in a car without wearing a seat belt? No   Have you felt unusual stress, anger or loneliness in the last month? No   Who do you live with? Spouse   If you need help, do you have trouble finding someone available to you? No   Have you been bothered in the last four weeks by sexual problems? Yes   Do you have difficulty concentrating, remembering or making decisions? No           Age-appropriate Screening Schedule:  Refer to the list below for future screening recommendations based on patient's age, sex and/or medical conditions. Orders for these recommended tests are listed in the plan section. The patient has been provided with a written plan.    Health Maintenance List  Health Maintenance   Topic Date Due    ANNUAL WELLNESS VISIT  Never done    Pneumococcal Vaccine 65+ (1 of 1 - PCV) Never done    INFLUENZA VACCINE  08/01/2024    LIPID PANEL  10/09/2024    ZOSTER VACCINE (1 of 2) 10/10/2024 (Originally 7/25/2008)    COVID-19 Vaccine (4 - 2023-24 season) 12/30/2024 (Originally 9/1/2024)    TDAP/TD VACCINES (1 - Tdap) 04/10/2025 (Originally [birth year]/25/1977)    BMI FOLLOWUP  10/10/2025    COLORECTAL CANCER SCREENING  11/14/2032    HEPATITIS C SCREENING  Completed                                                                                                                                                CMS Preventative Services  "Quick Reference  Risk Factors Identified During Encounter  None Identified    The above risks/problems have been discussed with the patient.  Pertinent information has been shared with the patient in the After Visit Summary.  An After Visit Summary and PPPS were made available to the patient.    Follow Up:   Next Medicare Wellness visit to be scheduled in 1 year.         Additional E&M Note during same encounter follows:  Patient has additional, significant, and separately identifiable condition(s)/problem(s) that require work above and beyond the Medicare Wellness Visit     Chief Complaint  Medicare Wellness-Initial Visit (Fasting/Would like Flu, Pneumonia shot)    Subjective     HPI    Saurabh is also being seen today for an annual adult preventative physical exam. Wife present with patient and states patient experiences road rage while driving. Pt does not believe he needs medication to help with mood. Informed patient that if he notices symptoms worsening, we can discuss as needed agents. Pt agreeable. He is due for cholesterol panel and prostate cancer screening. Denies chest pain and shortness of breath. Denies changes in bowel or bladder habits.Educated he may receive Tdap, Shingles, and COVID vaccinations at his pharmacy. Flu and Pneumonia vaccinations given in clinic.             Objective   Vital Signs:  /88 (BP Location: Left arm, Patient Position: Sitting, Cuff Size: Large Adult)   Pulse 64   Temp 98.6 °F (37 °C)   Ht 170.2 cm (67.01\")   Wt 106 kg (233 lb 9.6 oz)   SpO2 97%   BMI 36.58 kg/m²       Physical Exam  Constitutional:       Appearance: Normal appearance.   HENT:      Head: Normocephalic.      Right Ear: Tympanic membrane, ear canal and external ear normal.      Left Ear: Tympanic membrane, ear canal and external ear normal.      Nose: Nose normal.      Mouth/Throat:      Mouth: Mucous membranes are moist.      Pharynx: Oropharynx is clear.   Eyes:      Conjunctiva/sclera: " Conjunctivae normal.      Pupils: Pupils are equal, round, and reactive to light.   Cardiovascular:      Rate and Rhythm: Normal rate and regular rhythm.      Pulses: Normal pulses.      Heart sounds: Normal heart sounds.   Pulmonary:      Effort: Pulmonary effort is normal.      Breath sounds: Normal breath sounds. No wheezing.   Abdominal:      General: Abdomen is flat.   Musculoskeletal:         General: Normal range of motion.      Cervical back: Normal range of motion.   Skin:     General: Skin is warm.   Neurological:      General: No focal deficit present.      Mental Status: He is alert and oriented to person, place, and time.   Psychiatric:         Mood and Affect: Mood normal.         Behavior: Behavior normal.         The following data was reviewed by: AMRIT Meléndez on 10/10/2024:        Assessment and Plan Additional age appropriate preventative wellness advice topics were discussed during today's preventative wellness exam(some topics already addressed during AWV portion of the note above):   Nutrition: Discussed nutrition plan with patient. Information shared in after visit summary. Goal is for a well balanced diet to enhance overall health.              Need for immunization against influenza    Medicare annual wellness visit, initial    Encounter for immunization    Screening for prostate cancer    Dyslipidemia      Orders Placed This Encounter   Procedures    Fluzone High-Dose 65+yrs ([id number])    Pneumococcal Conjugate Vaccine 20-Valent (PCV20)    Comprehensive metabolic panel     Order Specific Question:   Release to patient     Answer:   Routine Release [[id number]]    Lipid panel     Order Specific Question:   Release to patient     Answer:   Routine Release [[id number]]    Urinalysis With Culture If Indicated - Urine, Clean Catch     Order Specific Question:   Release to patient     Answer:   Routine Release [[id number]]    PSA SCREENING     Order Specific Question:   Release to  patient     Answer:   Routine Release [[id number]]    CBC w AUTO Differential     Order Specific Question:   Manual Differential     Answer:   No     Order Specific Question:   Release to patient     Answer:   Routine Release [[id number]]             Follow Up   Return in about 6 months (around 4/10/2025) for Next scheduled follow up.  Patient was given instructions and counseling regarding his condition or for health maintenance advice. Please see specific information pulled into the AVS if appropriate.

## 2024-10-11 LAB
ALBUMIN SERPL-MCNC: 4.4 G/DL (ref 3.5–5.2)
ALBUMIN/GLOB SERPL: 2 G/DL
ALP SERPL-CCNC: 72 U/L (ref 39–117)
ALT SERPL-CCNC: 18 U/L (ref 1–41)
APPEARANCE UR: CLEAR
AST SERPL-CCNC: 17 U/L (ref 1–40)
BACTERIA #/AREA URNS HPF: NORMAL /[HPF]
BASOPHILS # BLD AUTO: 0.03 10*3/MM3 (ref 0–0.2)
BASOPHILS NFR BLD AUTO: 0.4 % (ref 0–1.5)
BILIRUB SERPL-MCNC: 0.6 MG/DL (ref 0–1.2)
BILIRUB UR QL STRIP: NEGATIVE
BUN SERPL-MCNC: 16 MG/DL (ref 8–23)
BUN/CREAT SERPL: 18.4 (ref 7–25)
CALCIUM SERPL-MCNC: 9.7 MG/DL (ref 8.6–10.5)
CASTS URNS QL MICRO: NORMAL /LPF
CHLORIDE SERPL-SCNC: 103 MMOL/L (ref 98–107)
CHOLEST SERPL-MCNC: 200 MG/DL (ref 0–200)
CO2 SERPL-SCNC: 28.2 MMOL/L (ref 22–29)
COLOR UR: YELLOW
CREAT SERPL-MCNC: 0.87 MG/DL (ref 0.76–1.27)
EGFRCR SERPLBLD CKD-EPI 2021: 95.2 ML/MIN/1.73
EOSINOPHIL # BLD AUTO: 0.13 10*3/MM3 (ref 0–0.4)
EOSINOPHIL NFR BLD AUTO: 1.9 % (ref 0.3–6.2)
EPI CELLS #/AREA URNS HPF: NORMAL /HPF (ref 0–10)
ERYTHROCYTE [DISTWIDTH] IN BLOOD BY AUTOMATED COUNT: 12.7 % (ref 12.3–15.4)
GLOBULIN SER CALC-MCNC: 2.2 GM/DL
GLUCOSE SERPL-MCNC: 100 MG/DL (ref 65–99)
GLUCOSE UR QL STRIP: NEGATIVE
HCT VFR BLD AUTO: 45.9 % (ref 37.5–51)
HDLC SERPL-MCNC: 74 MG/DL (ref 40–60)
HGB BLD-MCNC: 15.9 G/DL (ref 13–17.7)
HGB UR QL STRIP: NEGATIVE
IMM GRANULOCYTES # BLD AUTO: 0.03 10*3/MM3 (ref 0–0.05)
IMM GRANULOCYTES NFR BLD AUTO: 0.4 % (ref 0–0.5)
KETONES UR QL STRIP: NEGATIVE
LDLC SERPL CALC-MCNC: 106 MG/DL (ref 0–100)
LEUKOCYTE ESTERASE UR QL STRIP: NEGATIVE
LYMPHOCYTES # BLD AUTO: 1.85 10*3/MM3 (ref 0.7–3.1)
LYMPHOCYTES NFR BLD AUTO: 26.7 % (ref 19.6–45.3)
MCH RBC QN AUTO: 31.7 PG (ref 26.6–33)
MCHC RBC AUTO-ENTMCNC: 34.6 G/DL (ref 31.5–35.7)
MCV RBC AUTO: 91.4 FL (ref 79–97)
MICRO URNS: NORMAL
MICRO URNS: NORMAL
MONOCYTES # BLD AUTO: 0.61 10*3/MM3 (ref 0.1–0.9)
MONOCYTES NFR BLD AUTO: 8.8 % (ref 5–12)
NEUTROPHILS # BLD AUTO: 4.28 10*3/MM3 (ref 1.7–7)
NEUTROPHILS NFR BLD AUTO: 61.8 % (ref 42.7–76)
NITRITE UR QL STRIP: NEGATIVE
NRBC BLD AUTO-RTO: 0 /100 WBC (ref 0–0.2)
PH UR STRIP: 7.5 [PH] (ref 5–7.5)
PLATELET # BLD AUTO: 294 10*3/MM3 (ref 140–450)
POTASSIUM SERPL-SCNC: 4.8 MMOL/L (ref 3.5–5.2)
PROT SERPL-MCNC: 6.6 G/DL (ref 6–8.5)
PROT UR QL STRIP: NEGATIVE
PSA SERPL-MCNC: 2.68 NG/ML (ref 0–4)
RBC # BLD AUTO: 5.02 10*6/MM3 (ref 4.14–5.8)
RBC #/AREA URNS HPF: NORMAL /HPF (ref 0–2)
SODIUM SERPL-SCNC: 141 MMOL/L (ref 136–145)
SP GR UR STRIP: 1.02 (ref 1–1.03)
TRIGL SERPL-MCNC: 115 MG/DL (ref 0–150)
URINALYSIS REFLEX: NORMAL
UROBILINOGEN UR STRIP-MCNC: 0.2 MG/DL (ref 0.2–1)
VLDLC SERPL CALC-MCNC: 20 MG/DL (ref 5–40)
WBC # BLD AUTO: 6.93 10*3/MM3 (ref 3.4–10.8)
WBC #/AREA URNS HPF: NORMAL /HPF (ref 0–5)

## 2025-01-29 ENCOUNTER — APPOINTMENT (OUTPATIENT)
Dept: GENERAL RADIOLOGY | Facility: HOSPITAL | Age: 65
End: 2025-01-29
Payer: MEDICARE

## 2025-01-29 ENCOUNTER — HOSPITAL ENCOUNTER (EMERGENCY)
Facility: HOSPITAL | Age: 65
Discharge: HOME | End: 2025-01-29
Attending: EMERGENCY MEDICINE | Admitting: EMERGENCY MEDICINE
Payer: MEDICARE

## 2025-01-29 VITALS
RESPIRATION RATE: 16 BRPM | SYSTOLIC BLOOD PRESSURE: 122 MMHG | OXYGEN SATURATION: 98 % | TEMPERATURE: 98.2 F | HEART RATE: 69 BPM | DIASTOLIC BLOOD PRESSURE: 84 MMHG

## 2025-01-29 DIAGNOSIS — S82.831A CLOSED FRACTURE OF DISTAL END OF RIGHT FIBULA, UNSPECIFIED FRACTURE MORPHOLOGY, INITIAL ENCOUNTER: Primary | ICD-10-CM

## 2025-01-29 PROCEDURE — 99283 EMERGENCY DEPT VISIT LOW MDM: CPT

## 2025-01-29 PROCEDURE — 73610 X-RAY EXAM OF ANKLE: CPT

## 2025-01-29 RX ORDER — HYDROCODONE BITARTRATE AND ACETAMINOPHEN 5; 325 MG/1; MG/1
1 TABLET ORAL ONCE
Status: COMPLETED | OUTPATIENT
Start: 2025-01-29 | End: 2025-01-29

## 2025-01-29 RX ORDER — HYDROCODONE BITARTRATE AND ACETAMINOPHEN 5; 325 MG/1; MG/1
1 TABLET ORAL EVERY 6 HOURS PRN
Qty: 12 TABLET | Refills: 0 | Status: SHIPPED | OUTPATIENT
Start: 2025-01-29

## 2025-01-29 RX ADMIN — HYDROCODONE BITARTRATE AND ACETAMINOPHEN 1 TABLET: 5; 325 TABLET ORAL at 19:35

## 2025-01-29 NOTE — ED PROVIDER NOTES
EMERGENCY DEPARTMENT ENCOUNTER      PCP: Jorge Oden APRN  Patient Care Team:  Jorge Oden APRN as PCP - General (Family Medicine)  Klever Pablo MD as Consulting Physician (Neurosurgery)  Justo Lucero MD as Consulting Physician (Urology)   Independent Historians: Patient    HPI:  Chief Complaint: Right ankle pain  A complete HPI/ROS/PMH/PSH/SH/FH are unobtainable due to: None    Chronic or social conditions impacting patient care (social determinants of health): None    Context: Saurabh Rogel is a 64 y.o. male who presents to the ED c/o acute right ankle pain after slipping on ice several days ago.  Patient states pain is worse when he tries to bear weight and ambulate.  Denies any previous surgery or fracture to the ankle.  Denies hitting head.    Review of prior external notes and/or external test results outside of this encounter: CMP on 10/10/2024 showed glucose of 100 and was otherwise unremarkable.  CBC from same date was also unremarkable.      PAST MEDICAL HISTORY  Active Ambulatory Problems     Diagnosis Date Noted    Screen for colon cancer 08/01/2022    Arthritis of left knee 08/01/2022     Resolved Ambulatory Problems     Diagnosis Date Noted    No Resolved Ambulatory Problems     Past Medical History:   Diagnosis Date    Anxiety     Eczema     HL (hearing loss) 1995       The patient has started, but not completed, their COVID-19 vaccination series.    PAST SURGICAL HISTORY  Past Surgical History:   Procedure Laterality Date    BLADDER SURGERY      COLONOSCOPY      LUMBAR DISC SURGERY      SPINE SURGERY  1994    Spine disc         FAMILY HISTORY  Family History   Problem Relation Age of Onset    Alzheimer's disease Mother     Colon cancer Father     Hypertension Brother     Hypertension Brother     Hypertension Brother     No Known Problems Maternal Grandmother     No Known Problems Maternal Grandfather     No Known Problems Paternal Grandmother     No Known Problems Paternal  Grandfather          SOCIAL HISTORY  Social History     Socioeconomic History    Marital status:    Tobacco Use    Smoking status: Never    Smokeless tobacco: Never   Vaping Use    Vaping status: Never Used   Substance and Sexual Activity    Alcohol use: Yes     Alcohol/week: 2.0 standard drinks of alcohol     Types: 2 Cans of beer per week     Comment: Occasionally    Drug use: No    Sexual activity: Not Currently     Partners: Female     Birth control/protection: Partner of same sex     Comment: Ed use pill         ALLERGIES  Patient has no known allergies.        REVIEW OF SYSTEMS  Review of Systems   Musculoskeletal:  Positive for arthralgias (Right ankle). Negative for back pain and neck pain.   Neurological:  Negative for weakness and numbness.        All systems reviewed and negative except for those discussed in HPI.       PHYSICAL EXAM    I have reviewed the triage vital signs and nursing notes.    ED Triage Vitals   Temp Heart Rate Resp BP SpO2   01/29/25 1555 01/29/25 1555 01/29/25 1555 01/29/25 1610 01/29/25 1555   98.2 °F (36.8 °C) 69 16 122/84 98 %      Temp src Heart Rate Source Patient Position BP Location FiO2 (%)   -- -- -- 01/29/25 1610 --      Right arm        Physical Exam  GENERAL: alert, no acute distress  SKIN: Warm, dry  HENT: Normocephalic, atraumatic  EYES: no scleral icterus  CV: regular rhythm, regular rate  RESPIRATORY: normal effort, lungs clear  ABDOMEN: soft, nontender, nondistended  MUSCULOSKELETAL: no deformity, area of swelling just above right lateral malleolus, no significant reproducible ttp, no ttp over base of 5th metatarsal, no proximal tib fib tenderness  NEURO: alert, moves all extremities, follows commands          LAB RESULTS  No results found for this or any previous visit (from the past 24 hours).    Ordered the above labs and independently reviewed and interpreted the results.        RADIOLOGY  XR Ankle 3+ View Right    Result Date: 1/29/2025  XR ANKLE 3+ VW  RIGHT-  INDICATION: Post fall  COMPARISON: None available      Acute mildly displaced oblique distal fibular fracture at the level of the syndesmosis. Ankle mortise maintained on nonweightbearing views. Soft tissue swelling about the ankle most pronounced laterally.  This report was finalized on 1/29/2025 5:29 PM by Dr. Rock Melo M.D on Workstation: [id number]; to (do)       I ordered the above noted radiological studies. Independently reviewed and interpreted by me.  See dictation for official radiology interpretation.      PROCEDURES    Procedures      MEDICATIONS GIVEN IN ER    Medications   HYDROcodone-acetaminophen (NORCO) 5-325 MG per tablet 1 tablet (1 tablet Oral Given 1/29/25 1935)         PROGRESS, DATA ANALYSIS, CONSULTS, AND MEDICAL DECISION MAKING    All labs have been independently reviewed and interpreted by me.  All radiology studies have been independently reviewed and interpreted by me and discussed with radiologist dictating the report.   EKG's independently reviewed and interpreted by me.  Discussion below represents my analysis of pertinent findings related to patient's condition, differential diagnosis, treatment plan and final disposition.    Differential diagnosis: fracture, sprain, contusion, dislocation    ED Course as of 01/29/25 1937 Wed Jan 29, 2025   1745 XR Ankle 3+ View Right  Radiology study independently interpreted by me and my findings are distal fibula fracture.   [DC]   1936 Patient is not a good candidate for crutches and feel this would increase risk of possible fall and additional injury.  He was placed in a tall walking boot and recommended nonweightbearing status at home.  He has scheduled appointment with orthopedics on Monday per spouse at bedside.  ER return precautions discussed. [DC]      ED Course User Index  [DC] Loida López PA             AS OF 19:37 EST VITALS:    BP - 122/84  HR - 69  TEMP - 98.2 °F (36.8 °C)  O2 SATS - 98%        DIAGNOSIS  Final diagnoses:    Closed fracture of distal end of right fibula, unspecified fracture morphology, initial encounter         DISPOSITION  ED Disposition       ED Disposition   Discharge    Condition   Stable    Comment   --                  Note Disclaimer: At [facility], we believe that sharing information builds trust and better relationships. You are receiving this note because you recently visited [facility]. It is possible you will see health information before a provider has talked with you about it. This kind of information can be easy to misunderstand. To help you fully understand what it means for your health, we urge you to discuss this note with your provider.         Loida López PA  01/29/25 1937

## 2025-02-07 ENCOUNTER — LAB (OUTPATIENT)
Dept: LAB | Facility: HOSPITAL | Age: 65
End: 2025-02-07
Payer: MEDICARE

## 2025-02-07 ENCOUNTER — TRANSCRIBE ORDERS (OUTPATIENT)
Dept: ADMINISTRATIVE | Facility: HOSPITAL | Age: 65
End: 2025-02-07
Payer: MEDICARE

## 2025-02-07 ENCOUNTER — HOSPITAL ENCOUNTER (OUTPATIENT)
Dept: CARDIOLOGY | Facility: HOSPITAL | Age: 65
Discharge: HOME | End: 2025-02-07
Payer: MEDICARE

## 2025-02-07 DIAGNOSIS — S82.841A FRACTURE OF ANKLE, BIMALLEOLAR, RIGHT, CLOSED, INITIAL ENCOUNTER: ICD-10-CM

## 2025-02-07 DIAGNOSIS — Z01.818 PRE-OP TESTING: ICD-10-CM

## 2025-02-07 DIAGNOSIS — S82.841A FRACTURE OF ANKLE, BIMALLEOLAR, RIGHT, CLOSED, INITIAL ENCOUNTER: Primary | ICD-10-CM

## 2025-02-07 LAB
ALBUMIN SERPL-MCNC: 4.1 G/DL (ref 3.5–5.2)
ALBUMIN/GLOB SERPL: 1.6 G/DL
ALP SERPL-CCNC: 71 U/L (ref 39–117)
ALT SERPL W P-5'-P-CCNC: 28 U/L (ref 1–41)
ANION GAP SERPL CALCULATED.3IONS-SCNC: 10 MMOL/L (ref 5–15)
AST SERPL-CCNC: 23 U/L (ref 1–40)
BASOPHILS # BLD AUTO: 0.04 10*3/MM3 (ref 0–0.2)
BASOPHILS NFR BLD AUTO: 0.5 % (ref 0–1.5)
BILIRUB SERPL-MCNC: 0.4 MG/DL (ref 0–1.2)
BUN SERPL-MCNC: 15 MG/DL (ref 8–23)
BUN/CREAT SERPL: 17 (ref 7–25)
CALCIUM SPEC-SCNC: 9 MG/DL (ref 8.6–10.5)
CHLORIDE SERPL-SCNC: 103 MMOL/L (ref 98–107)
CO2 SERPL-SCNC: 26 MMOL/L (ref 22–29)
CREAT SERPL-MCNC: 0.88 MG/DL (ref 0.76–1.27)
DEPRECATED RDW RBC AUTO: 37 FL (ref 37–54)
EGFRCR SERPLBLD CKD-EPI 2021: 94.8 ML/MIN/1.73
EOSINOPHIL # BLD AUTO: 0.23 10*3/MM3 (ref 0–0.4)
EOSINOPHIL NFR BLD AUTO: 3 % (ref 0.3–6.2)
ERYTHROCYTE [DISTWIDTH] IN BLOOD BY AUTOMATED COUNT: 11.7 % (ref 12.3–15.4)
GLOBULIN UR ELPH-MCNC: 2.6 GM/DL
GLUCOSE SERPL-MCNC: 96 MG/DL (ref 65–99)
HCT VFR BLD AUTO: 43.5 % (ref 37.5–51)
HGB BLD-MCNC: 15.1 G/DL (ref 13–17.7)
IMM GRANULOCYTES # BLD AUTO: 0.03 10*3/MM3 (ref 0–0.05)
IMM GRANULOCYTES NFR BLD AUTO: 0.4 % (ref 0–0.5)
LYMPHOCYTES # BLD AUTO: 1.92 10*3/MM3 (ref 0.7–3.1)
LYMPHOCYTES NFR BLD AUTO: 24.9 % (ref 19.6–45.3)
MCH RBC QN AUTO: 30.6 PG (ref 26.6–33)
MCHC RBC AUTO-ENTMCNC: 34.7 G/DL (ref 31.5–35.7)
MCV RBC AUTO: 88.2 FL (ref 79–97)
MONOCYTES # BLD AUTO: 0.85 10*3/MM3 (ref 0.1–0.9)
MONOCYTES NFR BLD AUTO: 11 % (ref 5–12)
NEUTROPHILS NFR BLD AUTO: 4.63 10*3/MM3 (ref 1.7–7)
NEUTROPHILS NFR BLD AUTO: 60.2 % (ref 42.7–76)
NRBC BLD AUTO-RTO: 0 /100 WBC (ref 0–0.2)
PLATELET # BLD AUTO: 316 10*3/MM3 (ref 140–450)
PMV BLD AUTO: 10.2 FL (ref 6–12)
POTASSIUM SERPL-SCNC: 4 MMOL/L (ref 3.5–5.2)
PROT SERPL-MCNC: 6.7 G/DL (ref 6–8.5)
QT INTERVAL: 406 MS
QTC INTERVAL: 435 MS
RBC # BLD AUTO: 4.93 10*6/MM3 (ref 4.14–5.8)
SODIUM SERPL-SCNC: 139 MMOL/L (ref 136–145)
WBC NRBC COR # BLD AUTO: 7.7 10*3/MM3 (ref 3.4–10.8)

## 2025-02-07 PROCEDURE — 93005 ELECTROCARDIOGRAM TRACING: CPT | Performed by: ORTHOPAEDIC SURGERY

## 2025-02-07 PROCEDURE — 80053 COMPREHEN METABOLIC PANEL: CPT

## 2025-02-07 PROCEDURE — 85025 COMPLETE CBC W/AUTO DIFF WBC: CPT

## 2025-02-07 PROCEDURE — 36415 COLL VENOUS BLD VENIPUNCTURE: CPT
